# Patient Record
Sex: MALE | Race: WHITE
[De-identification: names, ages, dates, MRNs, and addresses within clinical notes are randomized per-mention and may not be internally consistent; named-entity substitution may affect disease eponyms.]

---

## 2017-01-02 ENCOUNTER — HOSPITAL ENCOUNTER (OUTPATIENT)
Dept: HOSPITAL 83 - PHLEB | Age: 67
Discharge: HOME | End: 2017-01-02
Attending: FAMILY MEDICINE
Payer: MEDICARE

## 2017-01-02 DIAGNOSIS — R73.09: Primary | ICD-10-CM

## 2019-06-04 ENCOUNTER — HOSPITAL ENCOUNTER (OUTPATIENT)
Dept: HOSPITAL 83 - LAB | Age: 69
Discharge: HOME | End: 2019-06-04
Attending: PODIATRIST
Payer: MEDICARE

## 2019-06-04 DIAGNOSIS — M10.9: Primary | ICD-10-CM

## 2019-06-04 DIAGNOSIS — E78.00: ICD-10-CM

## 2019-06-04 DIAGNOSIS — L03.90: ICD-10-CM

## 2019-06-04 LAB
ALBUMIN SERPL-MCNC: 3.3 GM/DL (ref 3.1–4.5)
ALP SERPL-CCNC: 92 U/L (ref 45–117)
ALT SERPL W P-5'-P-CCNC: 30 U/L (ref 12–78)
AST SERPL-CCNC: 21 IU/L (ref 3–35)
BASOPHILS # BLD AUTO: 0 10*3/UL (ref 0–0.1)
BASOPHILS NFR BLD AUTO: 0.3 % (ref 0–1)
BUN SERPL-MCNC: 6 MG/DL (ref 7–24)
CHLORIDE SERPL-SCNC: 100 MMOL/L (ref 98–107)
CREAT SERPL-MCNC: 0.95 MG/DL (ref 0.7–1.3)
EOSINOPHIL # BLD AUTO: 0.1 10*3/UL (ref 0–0.4)
EOSINOPHIL # BLD AUTO: 0.7 % (ref 1–4)
ERYTHROCYTE [DISTWIDTH] IN BLOOD BY AUTOMATED COUNT: 12.2 % (ref 0–14.5)
HCT VFR BLD AUTO: 54.6 % (ref 42–52)
HGB BLD-MCNC: 18.8 G/DL (ref 14–18)
LYMPHOCYTES # BLD AUTO: 1.5 10*3/UL (ref 1.3–4.4)
LYMPHOCYTES NFR BLD AUTO: 22.3 % (ref 27–41)
MCH RBC QN AUTO: 33.8 PG (ref 27–31)
MCHC RBC AUTO-ENTMCNC: 34.4 G/DL (ref 33–37)
MCV RBC AUTO: 98 FL (ref 80–94)
MONOCYTES # BLD AUTO: 0.9 10*3/UL (ref 0.1–1)
MONOCYTES NFR BLD MANUAL: 12.8 % (ref 3–9)
NEUT #: 4.3 10*3/UL (ref 2.3–7.9)
NEUT %: 63.6 % (ref 47–73)
NRBC BLD QL AUTO: 0 10*3/UL (ref 0–0)
PLATELET # BLD AUTO: 225 10*3/UL (ref 130–400)
PMV BLD AUTO: 9.2 FL (ref 9.6–12.3)
POTASSIUM SERPL-SCNC: 4.3 MMOL/L (ref 3.5–5.1)
PROT SERPL-MCNC: 7.7 GM/DL (ref 6.4–8.2)
RBC # BLD AUTO: 5.57 10*6/UL (ref 4.5–5.9)
SODIUM SERPL-SCNC: 138 MMOL/L (ref 136–145)
URATE SERPL-MCNC: 5.3 MG/DL (ref 3.5–7.2)
WBC NRBC COR # BLD AUTO: 6.7 10*3/UL (ref 4.8–10.8)

## 2019-06-05 LAB
ACTIN IGG SERPL-ACNC: 8 UNITS (ref 0–19)
RHEUMATOID FACT SERPL-ACNC: 15.2 IU/ML (ref 0–13.9)
SPECKLED PATTERN: (no result)

## 2019-07-15 ENCOUNTER — HOSPITAL ENCOUNTER (OUTPATIENT)
Dept: HOSPITAL 83 - LAB | Age: 69
Discharge: HOME | End: 2019-07-15
Attending: FAMILY MEDICINE
Payer: MEDICARE

## 2019-07-15 DIAGNOSIS — Z85.46: ICD-10-CM

## 2019-07-15 DIAGNOSIS — E11.9: Primary | ICD-10-CM

## 2019-07-15 DIAGNOSIS — R97.20: ICD-10-CM

## 2019-07-15 LAB
T4 SERPL-MCNC: 8.7 UG/DL (ref 4.5–12.1)
TSH SERPL DL<=0.005 MIU/L-ACNC: 1.53 UIU/ML (ref 0.36–4.75)

## 2020-02-27 ENCOUNTER — HOSPITAL ENCOUNTER (EMERGENCY)
Dept: HOSPITAL 83 - ED | Age: 70
Discharge: ANOTHER HOSPITAL | End: 2020-02-27
Payer: MEDICARE

## 2020-02-27 ENCOUNTER — HOSPITAL ENCOUNTER (INPATIENT)
Age: 70
LOS: 2 days | Discharge: HOME | DRG: 176 | End: 2020-02-29
Attending: HOSPITALIST | Admitting: FAMILY MEDICINE
Payer: MEDICARE

## 2020-02-27 VITALS — DIASTOLIC BLOOD PRESSURE: 81 MMHG

## 2020-02-27 VITALS — DIASTOLIC BLOOD PRESSURE: 93 MMHG | SYSTOLIC BLOOD PRESSURE: 119 MMHG

## 2020-02-27 VITALS — WEIGHT: 215 LBS | HEIGHT: 73.98 IN | BODY MASS INDEX: 27.59 KG/M2

## 2020-02-27 VITALS — SYSTOLIC BLOOD PRESSURE: 124 MMHG | DIASTOLIC BLOOD PRESSURE: 98 MMHG

## 2020-02-27 VITALS — DIASTOLIC BLOOD PRESSURE: 98 MMHG

## 2020-02-27 VITALS — DIASTOLIC BLOOD PRESSURE: 90 MMHG | SYSTOLIC BLOOD PRESSURE: 130 MMHG

## 2020-02-27 VITALS — SYSTOLIC BLOOD PRESSURE: 136 MMHG | DIASTOLIC BLOOD PRESSURE: 89 MMHG

## 2020-02-27 VITALS — DIASTOLIC BLOOD PRESSURE: 93 MMHG

## 2020-02-27 VITALS — DIASTOLIC BLOOD PRESSURE: 79 MMHG | SYSTOLIC BLOOD PRESSURE: 138 MMHG

## 2020-02-27 VITALS — DIASTOLIC BLOOD PRESSURE: 94 MMHG

## 2020-02-27 VITALS — DIASTOLIC BLOOD PRESSURE: 77 MMHG

## 2020-02-27 DIAGNOSIS — I82.402: Primary | ICD-10-CM

## 2020-02-27 DIAGNOSIS — E11.9: ICD-10-CM

## 2020-02-27 DIAGNOSIS — I26.99: ICD-10-CM

## 2020-02-27 DIAGNOSIS — E78.00: ICD-10-CM

## 2020-02-27 DIAGNOSIS — Z79.899: ICD-10-CM

## 2020-02-27 DIAGNOSIS — R79.89: ICD-10-CM

## 2020-02-27 LAB
ALBUMIN SERPL-MCNC: 3.3 GM/DL (ref 3.1–4.5)
ALP SERPL-CCNC: 90 U/L (ref 45–117)
ALT SERPL W P-5'-P-CCNC: 27 U/L (ref 12–78)
APTT PPP: 26.2 SECONDS (ref 20–32.1)
AST SERPL-CCNC: 22 IU/L (ref 3–35)
BASOPHILS # BLD AUTO: 0 10*3/UL (ref 0–0.1)
BASOPHILS NFR BLD AUTO: 0.2 % (ref 0–1)
BUN SERPL-MCNC: 8 MG/DL (ref 7–24)
CHLORIDE SERPL-SCNC: 104 MMOL/L (ref 98–107)
CREAT SERPL-MCNC: 1.05 MG/DL (ref 0.7–1.3)
EOSINOPHIL # BLD AUTO: 0.2 10*3/UL (ref 0–0.4)
EOSINOPHIL # BLD AUTO: 1.5 % (ref 1–4)
ERYTHROCYTE [DISTWIDTH] IN BLOOD BY AUTOMATED COUNT: 12.3 % (ref 0–14.5)
HCT VFR BLD AUTO: 58.4 % (ref 42–52)
HGB BLD-MCNC: 20 G/DL (ref 14–18)
INR BLD: 1 (ref 2–3.5)
LYMPHOCYTES # BLD AUTO: 3.1 10*3/UL (ref 1.3–4.4)
LYMPHOCYTES NFR BLD AUTO: 29.3 % (ref 27–41)
MCH RBC QN AUTO: 33.1 PG (ref 27–31)
MCHC RBC AUTO-ENTMCNC: 34.2 G/DL (ref 33–37)
MCV RBC AUTO: 96.7 FL (ref 80–94)
MONOCYTES # BLD AUTO: 1.1 10*3/UL (ref 0.1–1)
MONOCYTES NFR BLD MANUAL: 10.2 % (ref 3–9)
NEUT #: 6.1 10*3/UL (ref 2.3–7.9)
NEUT %: 58.6 % (ref 47–73)
NRBC BLD QL AUTO: 0 10*3/UL (ref 0–0)
PLATELET # BLD AUTO: 120 10*3/UL (ref 130–400)
PMV BLD AUTO: 9.2 FL (ref 9.6–12.3)
POTASSIUM SERPL-SCNC: 3.9 MMOL/L (ref 3.5–5.1)
PROT SERPL-MCNC: 7.3 GM/DL (ref 6.4–8.2)
RBC # BLD AUTO: 6.04 10*6/UL (ref 4.5–5.9)
SODIUM SERPL-SCNC: 139 MMOL/L (ref 136–145)
TROPONIN I SERPL-MCNC: 0.97 NG/ML (ref ?–0.04)
WBC NRBC COR # BLD AUTO: 10.5 10*3/UL (ref 4.8–10.8)

## 2020-02-27 PROCEDURE — 2140000000 HC CCU INTERMEDIATE R&B

## 2020-02-27 ASSESSMENT — PAIN SCALES - GENERAL: PAINLEVEL_OUTOF10: 0

## 2020-02-28 ENCOUNTER — APPOINTMENT (OUTPATIENT)
Dept: CT IMAGING | Age: 70
DRG: 176 | End: 2020-02-28
Attending: HOSPITALIST
Payer: MEDICARE

## 2020-02-28 PROBLEM — I49.9 IRREGULAR HEART BEATS: Status: ACTIVE | Noted: 2020-02-28

## 2020-02-28 PROBLEM — E11.65 TYPE 2 DIABETES MELLITUS WITH HYPERGLYCEMIA (HCC): Status: ACTIVE | Noted: 2020-02-28

## 2020-02-28 PROBLEM — I82.4Z2 DVT, LOWER EXTREMITY, DISTAL, ACUTE, LEFT (HCC): Status: ACTIVE | Noted: 2020-02-28

## 2020-02-28 PROBLEM — R77.8 ELEVATED TROPONIN: Status: ACTIVE | Noted: 2020-02-28

## 2020-02-28 PROBLEM — F10.20 UNCOMPLICATED ALCOHOL DEPENDENCE (HCC): Status: ACTIVE | Noted: 2020-02-28

## 2020-02-28 PROBLEM — R55 SYNCOPE AND COLLAPSE: Status: ACTIVE | Noted: 2020-02-28

## 2020-02-28 PROBLEM — Z85.46 HISTORY OF PROSTATE CANCER: Status: ACTIVE | Noted: 2020-02-28

## 2020-02-28 PROBLEM — D75.1 POLYCYTHEMIA: Status: ACTIVE | Noted: 2020-02-28

## 2020-02-28 PROBLEM — G62.9 PERIPHERAL NEUROPATHY: Status: ACTIVE | Noted: 2020-02-28

## 2020-02-28 PROBLEM — R65.10 SIRS (SYSTEMIC INFLAMMATORY RESPONSE SYNDROME) (HCC): Status: ACTIVE | Noted: 2020-02-28

## 2020-02-28 LAB
ALBUMIN SERPL-MCNC: 3.6 G/DL (ref 3.5–5.2)
ALP BLD-CCNC: 81 U/L (ref 40–129)
ALT SERPL-CCNC: 16 U/L (ref 0–40)
ANION GAP SERPL CALCULATED.3IONS-SCNC: 15 MMOL/L (ref 7–16)
APTT: 39.7 SEC (ref 24.5–35.1)
APTT: 82.2 SEC (ref 24.5–35.1)
APTT: 82.4 SEC (ref 24.5–35.1)
AST SERPL-CCNC: 26 U/L (ref 0–39)
BASOPHILS ABSOLUTE: 0.02 E9/L (ref 0–0.2)
BASOPHILS ABSOLUTE: 0.02 E9/L (ref 0–0.2)
BASOPHILS RELATIVE PERCENT: 0.2 % (ref 0–2)
BASOPHILS RELATIVE PERCENT: 0.2 % (ref 0–2)
BILIRUB SERPL-MCNC: 0.7 MG/DL (ref 0–1.2)
BUN BLDV-MCNC: 9 MG/DL (ref 8–23)
CALCIUM SERPL-MCNC: 9.6 MG/DL (ref 8.6–10.2)
CHLORIDE BLD-SCNC: 99 MMOL/L (ref 98–107)
CK MB: 5.6 NG/ML (ref 0–7.7)
CO2: 24 MMOL/L (ref 22–29)
CREAT SERPL-MCNC: 0.7 MG/DL (ref 0.7–1.2)
EKG ATRIAL RATE: 90 BPM
EKG P AXIS: 49 DEGREES
EKG P-R INTERVAL: 210 MS
EKG Q-T INTERVAL: 386 MS
EKG QRS DURATION: 98 MS
EKG QTC CALCULATION (BAZETT): 472 MS
EKG R AXIS: -23 DEGREES
EKG T AXIS: 50 DEGREES
EKG VENTRICULAR RATE: 90 BPM
EOSINOPHILS ABSOLUTE: 0.1 E9/L (ref 0.05–0.5)
EOSINOPHILS ABSOLUTE: 0.14 E9/L (ref 0.05–0.5)
EOSINOPHILS RELATIVE PERCENT: 1.1 % (ref 0–6)
EOSINOPHILS RELATIVE PERCENT: 1.6 % (ref 0–6)
FOLATE: >20 NG/ML (ref 4.8–24.2)
GFR AFRICAN AMERICAN: >60
GFR NON-AFRICAN AMERICAN: >60 ML/MIN/1.73
GLUCOSE BLD-MCNC: 205 MG/DL (ref 74–99)
HBA1C MFR BLD: 8.1 % (ref 4–5.6)
HCT VFR BLD CALC: 56.3 % (ref 37–54)
HCT VFR BLD CALC: 60.7 % (ref 37–54)
HEMOGLOBIN: 18.8 G/DL (ref 12.5–16.5)
HEMOGLOBIN: 20.2 G/DL (ref 12.5–16.5)
HOMOCYSTEINE: 10.2 UMOL/L (ref 0–15)
IMMATURE GRANULOCYTES #: 0.02 E9/L
IMMATURE GRANULOCYTES #: 0.04 E9/L
IMMATURE GRANULOCYTES %: 0.2 % (ref 0–5)
IMMATURE GRANULOCYTES %: 0.5 % (ref 0–5)
INR BLD: 1
LV EF: 55 %
LVEF MODALITY: NORMAL
LYMPHOCYTES ABSOLUTE: 2.21 E9/L (ref 1.5–4)
LYMPHOCYTES ABSOLUTE: 2.38 E9/L (ref 1.5–4)
LYMPHOCYTES RELATIVE PERCENT: 25.1 % (ref 20–42)
LYMPHOCYTES RELATIVE PERCENT: 26.6 % (ref 20–42)
MCH RBC QN AUTO: 32 PG (ref 26–35)
MCH RBC QN AUTO: 32.3 PG (ref 26–35)
MCHC RBC AUTO-ENTMCNC: 33.3 % (ref 32–34.5)
MCHC RBC AUTO-ENTMCNC: 33.4 % (ref 32–34.5)
MCV RBC AUTO: 95.7 FL (ref 80–99.9)
MCV RBC AUTO: 97 FL (ref 80–99.9)
METER GLUCOSE: 163 MG/DL (ref 74–99)
METER GLUCOSE: 177 MG/DL (ref 74–99)
METER GLUCOSE: 180 MG/DL (ref 74–99)
METER GLUCOSE: 266 MG/DL (ref 74–99)
MONOCYTES ABSOLUTE: 0.94 E9/L (ref 0.1–0.95)
MONOCYTES ABSOLUTE: 1.14 E9/L (ref 0.1–0.95)
MONOCYTES RELATIVE PERCENT: 10.7 % (ref 2–12)
MONOCYTES RELATIVE PERCENT: 12.7 % (ref 2–12)
NEUTROPHILS ABSOLUTE: 5.3 E9/L (ref 1.8–7.3)
NEUTROPHILS ABSOLUTE: 5.47 E9/L (ref 1.8–7.3)
NEUTROPHILS RELATIVE PERCENT: 59.2 % (ref 43–80)
NEUTROPHILS RELATIVE PERCENT: 61.9 % (ref 43–80)
PATHOLOGIST REVIEW: NORMAL
PDW BLD-RTO: 12.2 FL (ref 11.5–15)
PDW BLD-RTO: 12.2 FL (ref 11.5–15)
PLATELET # BLD: 115 E9/L (ref 130–450)
PLATELET # BLD: 119 E9/L (ref 130–450)
PMV BLD AUTO: 9.2 FL (ref 7–12)
PMV BLD AUTO: 9.5 FL (ref 7–12)
POTASSIUM SERPL-SCNC: 4 MMOL/L (ref 3.5–5)
PROSTATE SPECIFIC ANTIGEN: 0.01 NG/ML (ref 0–4)
PROTHROMBIN TIME: 11.1 SEC (ref 9.3–12.4)
RBC # BLD: 5.88 E12/L (ref 3.8–5.8)
RBC # BLD: 6.26 E12/L (ref 3.8–5.8)
REASON FOR REJECTION: NORMAL
REJECTED TEST: NORMAL
SODIUM BLD-SCNC: 138 MMOL/L (ref 132–146)
TOTAL CK: 152 U/L (ref 20–200)
TOTAL PROTEIN: 6.9 G/DL (ref 6.4–8.3)
TROPONIN: 0.04 NG/ML (ref 0–0.03)
TROPONIN: 0.06 NG/ML (ref 0–0.03)
TSH SERPL DL<=0.05 MIU/L-ACNC: 3.97 UIU/ML (ref 0.27–4.2)
VITAMIN B-12: 804 PG/ML (ref 211–946)
WBC # BLD: 8.8 E9/L (ref 4.5–11.5)
WBC # BLD: 9 E9/L (ref 4.5–11.5)

## 2020-02-28 PROCEDURE — 85025 COMPLETE CBC W/AUTO DIFF WBC: CPT

## 2020-02-28 PROCEDURE — 93010 ELECTROCARDIOGRAM REPORT: CPT | Performed by: INTERNAL MEDICINE

## 2020-02-28 PROCEDURE — 82607 VITAMIN B-12: CPT

## 2020-02-28 PROCEDURE — 6370000000 HC RX 637 (ALT 250 FOR IP): Performed by: HOSPITALIST

## 2020-02-28 PROCEDURE — 83090 ASSAY OF HOMOCYSTEINE: CPT

## 2020-02-28 PROCEDURE — 82553 CREATINE MB FRACTION: CPT

## 2020-02-28 PROCEDURE — 81241 F5 GENE: CPT

## 2020-02-28 PROCEDURE — 86146 BETA-2 GLYCOPROTEIN ANTIBODY: CPT

## 2020-02-28 PROCEDURE — 81240 F2 GENE: CPT

## 2020-02-28 PROCEDURE — 82668 ASSAY OF ERYTHROPOIETIN: CPT

## 2020-02-28 PROCEDURE — 83036 HEMOGLOBIN GLYCOSYLATED A1C: CPT

## 2020-02-28 PROCEDURE — 85730 THROMBOPLASTIN TIME PARTIAL: CPT

## 2020-02-28 PROCEDURE — 2500000003 HC RX 250 WO HCPCS: Performed by: FAMILY MEDICINE

## 2020-02-28 PROCEDURE — 2580000003 HC RX 258: Performed by: FAMILY MEDICINE

## 2020-02-28 PROCEDURE — 82962 GLUCOSE BLOOD TEST: CPT

## 2020-02-28 PROCEDURE — 84153 ASSAY OF PSA TOTAL: CPT

## 2020-02-28 PROCEDURE — 6370000000 HC RX 637 (ALT 250 FOR IP): Performed by: FAMILY MEDICINE

## 2020-02-28 PROCEDURE — 6360000004 HC RX CONTRAST MEDICATION: Performed by: FAMILY MEDICINE

## 2020-02-28 PROCEDURE — 81400 MOPATH PROCEDURE LEVEL 1: CPT

## 2020-02-28 PROCEDURE — 82550 ASSAY OF CK (CPK): CPT

## 2020-02-28 PROCEDURE — 2580000003 HC RX 258: Performed by: RADIOLOGY

## 2020-02-28 PROCEDURE — 6360000004 HC RX CONTRAST MEDICATION: Performed by: RADIOLOGY

## 2020-02-28 PROCEDURE — 93005 ELECTROCARDIOGRAM TRACING: CPT | Performed by: FAMILY MEDICINE

## 2020-02-28 PROCEDURE — 86147 CARDIOLIPIN ANTIBODY EA IG: CPT

## 2020-02-28 PROCEDURE — 6370000000 HC RX 637 (ALT 250 FOR IP): Performed by: INTERNAL MEDICINE

## 2020-02-28 PROCEDURE — 6360000002 HC RX W HCPCS: Performed by: FAMILY MEDICINE

## 2020-02-28 PROCEDURE — 93306 TTE W/DOPPLER COMPLETE: CPT

## 2020-02-28 PROCEDURE — 85610 PROTHROMBIN TIME: CPT

## 2020-02-28 PROCEDURE — 81291 MTHFR GENE: CPT

## 2020-02-28 PROCEDURE — 82746 ASSAY OF FOLIC ACID SERUM: CPT

## 2020-02-28 PROCEDURE — 84443 ASSAY THYROID STIM HORMONE: CPT

## 2020-02-28 PROCEDURE — 74177 CT ABD & PELVIS W/CONTRAST: CPT

## 2020-02-28 PROCEDURE — 80053 COMPREHEN METABOLIC PANEL: CPT

## 2020-02-28 PROCEDURE — 2700000000 HC OXYGEN THERAPY PER DAY

## 2020-02-28 PROCEDURE — 84484 ASSAY OF TROPONIN QUANT: CPT

## 2020-02-28 PROCEDURE — 99223 1ST HOSP IP/OBS HIGH 75: CPT | Performed by: SURGERY

## 2020-02-28 PROCEDURE — 2140000000 HC CCU INTERMEDIATE R&B

## 2020-02-28 PROCEDURE — 36415 COLL VENOUS BLD VENIPUNCTURE: CPT

## 2020-02-28 RX ORDER — LANOLIN ALCOHOL/MO/W.PET/CERES
3 CREAM (GRAM) TOPICAL NIGHTLY PRN
Status: DISCONTINUED | OUTPATIENT
Start: 2020-02-28 | End: 2020-02-29 | Stop reason: HOSPADM

## 2020-02-28 RX ORDER — DEXTROSE MONOHYDRATE 50 MG/ML
100 INJECTION, SOLUTION INTRAVENOUS PRN
Status: DISCONTINUED | OUTPATIENT
Start: 2020-02-28 | End: 2020-02-29 | Stop reason: HOSPADM

## 2020-02-28 RX ORDER — ONDANSETRON 2 MG/ML
4 INJECTION INTRAMUSCULAR; INTRAVENOUS EVERY 6 HOURS PRN
Status: DISCONTINUED | OUTPATIENT
Start: 2020-02-28 | End: 2020-02-29 | Stop reason: HOSPADM

## 2020-02-28 RX ORDER — NICOTINE POLACRILEX 4 MG
15 LOZENGE BUCCAL PRN
Status: DISCONTINUED | OUTPATIENT
Start: 2020-02-28 | End: 2020-02-29 | Stop reason: HOSPADM

## 2020-02-28 RX ORDER — THIAMINE MONONITRATE (VIT B1) 100 MG
100 TABLET ORAL DAILY
Status: DISCONTINUED | OUTPATIENT
Start: 2020-02-29 | End: 2020-02-29 | Stop reason: HOSPADM

## 2020-02-28 RX ORDER — HEPARIN SODIUM 1000 [USP'U]/ML
40 INJECTION, SOLUTION INTRAVENOUS; SUBCUTANEOUS PRN
Status: DISCONTINUED | OUTPATIENT
Start: 2020-02-28 | End: 2020-02-29 | Stop reason: HOSPADM

## 2020-02-28 RX ORDER — SODIUM CHLORIDE 0.9 % (FLUSH) 0.9 %
10 SYRINGE (ML) INJECTION EVERY 12 HOURS SCHEDULED
Status: DISCONTINUED | OUTPATIENT
Start: 2020-02-28 | End: 2020-02-29 | Stop reason: HOSPADM

## 2020-02-28 RX ORDER — SODIUM CHLORIDE 0.9 % (FLUSH) 0.9 %
10 SYRINGE (ML) INJECTION PRN
Status: DISCONTINUED | OUTPATIENT
Start: 2020-02-28 | End: 2020-02-29 | Stop reason: HOSPADM

## 2020-02-28 RX ORDER — HEPARIN SODIUM 1000 [USP'U]/ML
80 INJECTION, SOLUTION INTRAVENOUS; SUBCUTANEOUS PRN
Status: DISCONTINUED | OUTPATIENT
Start: 2020-02-28 | End: 2020-02-29 | Stop reason: HOSPADM

## 2020-02-28 RX ORDER — MULTIVITAMIN WITH FOLIC ACID 400 MCG
1 TABLET ORAL DAILY
Status: DISCONTINUED | OUTPATIENT
Start: 2020-02-28 | End: 2020-02-29 | Stop reason: HOSPADM

## 2020-02-28 RX ORDER — ACETAMINOPHEN 325 MG/1
650 TABLET ORAL EVERY 6 HOURS PRN
Status: DISCONTINUED | OUTPATIENT
Start: 2020-02-28 | End: 2020-02-29 | Stop reason: HOSPADM

## 2020-02-28 RX ORDER — POLYETHYLENE GLYCOL 3350 17 G/17G
17 POWDER, FOR SOLUTION ORAL DAILY PRN
Status: DISCONTINUED | OUTPATIENT
Start: 2020-02-28 | End: 2020-02-29 | Stop reason: HOSPADM

## 2020-02-28 RX ORDER — HEPARIN SODIUM 10000 [USP'U]/100ML
18 INJECTION, SOLUTION INTRAVENOUS CONTINUOUS
Status: DISCONTINUED | OUTPATIENT
Start: 2020-02-28 | End: 2020-02-28 | Stop reason: CLARIF

## 2020-02-28 RX ORDER — DEXTROSE MONOHYDRATE 25 G/50ML
12.5 INJECTION, SOLUTION INTRAVENOUS PRN
Status: DISCONTINUED | OUTPATIENT
Start: 2020-02-28 | End: 2020-02-29 | Stop reason: HOSPADM

## 2020-02-28 RX ADMIN — PERFLUTREN 1.65 MG: 6.52 INJECTION, SUSPENSION INTRAVENOUS at 09:15

## 2020-02-28 RX ADMIN — INSULIN LISPRO 2 UNITS: 100 INJECTION, SOLUTION INTRAVENOUS; SUBCUTANEOUS at 07:38

## 2020-02-28 RX ADMIN — SODIUM CHLORIDE, PRESERVATIVE FREE 10 ML: 5 INJECTION INTRAVENOUS at 20:49

## 2020-02-28 RX ADMIN — MELATONIN 3 MG ORAL TABLET 3 MG: 3 TABLET ORAL at 22:47

## 2020-02-28 RX ADMIN — INSULIN LISPRO 2 UNITS: 100 INJECTION, SOLUTION INTRAVENOUS; SUBCUTANEOUS at 20:43

## 2020-02-28 RX ADMIN — INSULIN LISPRO 6 UNITS: 100 INJECTION, SOLUTION INTRAVENOUS; SUBCUTANEOUS at 17:32

## 2020-02-28 RX ADMIN — IOHEXOL 50 ML: 240 INJECTION, SOLUTION INTRATHECAL; INTRAVASCULAR; INTRAVENOUS; ORAL at 11:26

## 2020-02-28 RX ADMIN — FOLIC ACID: 5 INJECTION, SOLUTION INTRAMUSCULAR; INTRAVENOUS; SUBCUTANEOUS at 05:07

## 2020-02-28 RX ADMIN — HEPARIN SODIUM 18 UNITS/KG/HR: 10000 INJECTION, SOLUTION INTRAVENOUS at 03:10

## 2020-02-28 RX ADMIN — APIXABAN 10 MG: 5 TABLET, FILM COATED ORAL at 20:49

## 2020-02-28 RX ADMIN — MAGNESIUM GLUCONATE 500 MG ORAL TABLET 400 MG: 500 TABLET ORAL at 20:43

## 2020-02-28 RX ADMIN — MULTIVITAMIN TABLET 1 TABLET: TABLET at 09:43

## 2020-02-28 RX ADMIN — MAGNESIUM GLUCONATE 500 MG ORAL TABLET 400 MG: 500 TABLET ORAL at 09:43

## 2020-02-28 RX ADMIN — IOPAMIDOL 110 ML: 755 INJECTION, SOLUTION INTRAVENOUS at 12:39

## 2020-02-28 RX ADMIN — Medication 10 ML: at 12:40

## 2020-02-28 ASSESSMENT — PAIN SCALES - GENERAL
PAINLEVEL_OUTOF10: 0

## 2020-02-28 NOTE — H&P
Hospital Medicine History & Physical      PCP: Aniceto Harris DO    Date of Admission: 2/27/2020    Date of Service: Pt seen/examined on 2/28/2020 and Admitted to Inpatient with expected LOS greater than two midnights due to medical therapy. Chief Complaint: Syncope and collapse      History Of Present Illness:      77 y.o. male who presented to [facility] from [place] with past medical history of gout, prostate cancer status post prostatectomy and radiation treatment in 2006, diabetes, hyperlipidemia, former tobacco dependence, alcohol dependence and diabetic neuropathy. Patient apparently has had elevated hemoglobin levels since 2016 which he is not aware of. He does not have any formal diagnosis in this regard. He was in his usual state of health until 2 days ago when he had syncopal episode and collapsed at home. Patient was going to stand up from a sitting position to answer a phone call when he turned around and passed out. Episode was brief, associated with dizziness. Patient woke up on the floor, he denies any diaphoresis. He has had no chest pain, shortness of breath or dyspnea on exertion. He did not go to the hospital until the day after. He has been having leg swelling for 3 days. This has been constant, mild to moderate in intensity and associated with minimal pain in the groin area. No redness. He denies any prior injury. No urinary complaints. Bowel movements can be slow at times. Medical records have been reviewed and summarized. Vital signs notable for heart rate of 113, respiratory 22 and blood pressure 136/105. Labs from outside facility showed hemoglobin of 20, platelet count 339 and white count of 10.5. Creatinine 1.1, glucose 227, INR 1, rest of chemistry were normal.  Magnesium 1.8, total bilirubin 1.3 otherwise normal liver enzymes. Initial troponin was 0.97-> 1.46-> 1.2. Chest x-ray is negative.   CTA of the lung shows extensive bilateral PEs without evidence

## 2020-02-28 NOTE — CONSULTS
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 D/c heparin gtt  No need of EKOS cath    Polycythemia  Work up per Hem-Onc  PSG as outpatient.

## 2020-02-28 NOTE — CONSULTS
[address]                  [address] [place],  [facility]                                  CONSULTATION    PATIENT NAME: Reyes Ross                     :        1950  MED REC NO:   [id number]                            ROOM:       8095  ACCOUNT NO:   [de-identified]                           ADMIT DATE: 2020  PROVIDER:     Davie Joel MD    CONSULT DATE:  2020    HISTORY OF PRESENT ILLNESS:  This 67-year-old white male, patient of Dr. Ting Hester and Dr. Jaquelin Fall, is referred with finding of bilateral  pulmonary emboli and DVT in the left lower extremity. He is admitted  after an episode of syncope. He was feeling well until about 2 days ago  when this episode happened, and he collapsed at home. Prior to that,  recently he denies any complaints related to any system whatsoever. No  fever. No weight loss. No history of any recent bed confinement or any  long car travel or airplane travel or any hormone medications or any  other recent complaints. PAST MEDICAL HISTORY:  Prostate cancer more than 10 years ago, and he  had radiation treatments, but he states he did not require any other  treatments. No history of any other cancer. No history of any  thrombosis. He had history of tonsillectomy and prostatectomy in ,  diabetes mellitus, diabetic neuropathy. ALLERGIES:  He is not allergic to any medicines. SOCIAL HISTORY:  He quit smoking cigarettes about 14 years ago. Drinks  alcohol six to eight beers a day. PHYSICAL EXAMINATION:  GENERAL:  He is alert and appropriate. HEENT:  No icterus. NECK:  No neck or axillary mass. HEART:  Regular. LUNGS:  Clear. ABDOMEN:  No palpable mass or ascites. EXTREMITIES:  There is edema of the left leg. NEUROLOGIC:  No focal neurological deficit. LABORATORY DATA:  The serum PSA is 0.01.   The CBC shows hemoglobin 18.8  gm; normal white blood cell count; platelets 115,000/mm3. The CMP is  unremarkable. ASSESSMENT AND PLAN:  1. Bilateral pulmonary emboli by the CTA of the chest at [facility], and the ultrasound examination showing left lower extremity  DVT. The episode appears to be unprovoked. He is on heparin infusion  at this time. 2.  The CTA of the chest at [facility] did not report any signs of  neoplasm. I will plan to obtain a CT scan of the abdomen and pelvis  also. I have discussed this with the patient. 3.  History of prostate cancer for which he had prostatectomy and  radiation treatments, not requiring any other treatments. This was few  years ago, and his serum PSA level is normal.  4.  Polycythemia and mild thrombocytopenia. Apparently, he has had the  high hemoglobin and hematocrit for the last three to four years  according to the notes. 5.  I have ordered the workup for thrombophilia as well as for the  polycythemia. Thank you for letting me participate in his care.         Keeley Sanchez MD    D: 02/28/2020 10:21:45       T: 02/28/2020 10:31:11     [email or web address]  Job#: [id number]     Doc#: [id number]    CC:

## 2020-02-28 NOTE — CARE COORDINATION
SOCIAL WORK/CASEMANAGEMENT TRANSITION OF CARE PLANNING: met with pt in the room this a.m. he lives with wife in a trailer with 1 step to enter. Pt and wife are both retired and independent with no dme or hhc . The plan is home with no needs. Will follow for any anticoagulation needs. [facility]  Jannie Bradshaw  2/28/2020

## 2020-02-28 NOTE — CONSULTS
(porcine) injection 7,340 Units, 80 Units/kg, Intravenous, PRN, Alisia Forrest MD    heparin (porcine) injection 3,670 Units, 40 Units/kg, Intravenous, PRN, Alisia Forrest MD    heparin 25,000 units in dextrose 5% 250 mL infusion, 18 Units/kg/hr, Intravenous, Continuous, Karlos Hernandez MD, Last Rate: 14.7 mL/hr at 20 1039, 16 Units/kg/hr at 20 1039    [START ON 2020] vitamin B-1 (THIAMINE) tablet 100 mg, 100 mg, Oral, Daily, Alisia Forrest MD    multivitamin 1 tablet, 1 tablet, Oral, Daily, Alisia Forrest MD, 1 tablet at 20 0943    magnesium oxide (MAG-OX) tablet 400 mg, 400 mg, Oral, BID, Alisia Forrest MD, 400 mg at 20 0943    insulin lispro (HUMALOG) injection vial 0-10 Units, 0-10 Units, Subcutaneous, 4x Daily AC & HS, Alisia Forrest MD, 2 Units at 20 0738    glucose (GLUTOSE) 40 % oral gel 15 g, 15 g, Oral, PRN, Alisia Forrest MD    dextrose 50 % IV solution, 12.5 g, Intravenous, PRN, Alisia Forrest MD    glucagon (rDNA) injection 1 mg, 1 mg, Intramuscular, PRN, Alisia Forrest MD    dextrose 5 % solution, 100 mL/hr, Intravenous, PRN, Alisia Forrest MD    sodium chloride flush 0.9 % injection 10 mL, 10 mL, Intravenous, PRN, Christelle Helm MD, 10 mL at 20 1240    Allergies:  Patient has no known allergies.     Social History     Socioeconomic History    Marital status:      Spouse name: Not on file    Number of children: Not on file    Years of education: Not on file    Highest education level: Not on file   Occupational History    Not on file   Social Needs    Financial resource strain: Not on file    Food insecurity:     Worry: Not on file     Inability: Not on file    Transportation needs:     Medical: Not on file     Non-medical: Not on file   Tobacco Use    Smoking status: Former Smoker     Types: Cigarettes     Last attempt to quit: 2006     Years since quittin.1   Substance and Sexual Activity    Alcohol use: Not on file    Drug use: Not on file    Sexual activity: Not on file   Lifestyle    Physical activity:     Days per week: Not on file     Minutes per session: Not on file    Stress: Not on file   Relationships    Social connections:     Talks on phone: Not on file     Gets together: Not on file     Attends [employer] service: Not on file     Active member of club or organization: Not on file     Attends meetings of clubs or organizations: Not on file     Relationship status: Not on file    Intimate partner violence:     Fear of current or ex partner: Not on file     Emotionally abused: Not on file     Physically abused: Not on file     Forced sexual activity: Not on file   Other Topics Concern    Not on file   Social History Narrative    Not on file        No family history on file. REVIEW OF SYSTEMS:    Gen: Negative for nausea, vomiting, diarrhea, fever, chills, night sweats, no weight loss or weight gain  HEENT: Negative for double vision, blurred vision, sore throat   Heart: Negative for HTN, palpitations, chest pain, or pain radiating to the arm, jaw or teeth  Lungs: See history of present illness negative for wheezes, shortness of breath while at rest or lying down  GI: Negative for nausea, vomiting, diarrhea, or constipation  : Negative for dysuria, hematuria, increased frequency or urgency  Endo: Negative for polydipsia, polyuria, or heat or cold intolerances. Heme: Negative leg swelling, blood or bleeding disorders  Psych: Negative for Depression or anxiety  Ortho: Negative for pain in the joints, arthritis or gout  Vascular: Negative for claudication, calf pain, ulcerations, or rest pain.  He also denies any nonhealing lower extremity wounds        PHYSICAL EXAM:    Vitals:    02/28/20 0807   BP: (!) 143/97   Pulse: 94   Resp: 18   Temp: 97.5 °F (36.4 °C)   SpO2: 96%     GENERAL APPEARANCE:  Well-developed well-nourished alert and oriented answers questions appropriately the patient does not appear in any acute distress. HEAD: Head is normocephalic atraumatic, with Normal range of motion. EYES: Inspection of the conjunctiva and lids demonstrated no abnormalities, no jaundice, no scleral icterus, PERRL, EOMI, and vision are grossly intact. EARS:  External auditory canals demonstrate no abnormalities. Ears are well set hearing is grossly intact. SKIN: Flaky and swollen compared to contralateral right side. But soft color is symmetrical to the contralateral right side the rest of the exam demonstrates normal in color, texture, and turgor, no visible lesions, no jaundice. NECK: Supple, nontender no lymphadenopathy trachea is midline no jugular venous distention no carotid bruits auscultated. LUNGS:  Clear to auscultation bilaterally no wheezes rales or rhonchi good respiratory changes noted. CARDIOVASCULAR: Currently regular rate and rhythm no murmur rub or gallop that I could appreciate. ABDOMEN: Soft nontender no rebound or guarding, positive bowel sounds no pulsatile abdominal masses. No organosplenomegaly that I can appreciate. EXTREMITIES: Bilateral palpable brachial radial pulses are symmetric. Bilateral palpable femoral pulses. Bilateral palpable dorsalis pedis and posterior tibial pulses. Left leg is swollen compared to contralateral right side. The calf is soft. Motor and sensation are intact. MUSKULOSKELETAL  adequately aligned spine, range of motion appears to be intact with regards to the spine and upper and lower extremities. No joint tenderness or erythema. Normal muscular development. NEURO: Cranial nerves II through XII grossly intact. Strength and sensation are symmetric and intact throughout. Psychiatric: Mental examination revealed the patient was oriented to person, place, and time.  The patient was able to demonstrate adequate judgment and reason, without any hallucinations abnormal affect or abnormal behavior on today's exam.      LABS:    Lab signed by Maria Teresa Salazar MD on 2/28/2020 at 12:45 PM

## 2020-02-29 VITALS
HEIGHT: 74 IN | DIASTOLIC BLOOD PRESSURE: 79 MMHG | HEART RATE: 77 BPM | OXYGEN SATURATION: 93 % | WEIGHT: 202.4 LBS | SYSTOLIC BLOOD PRESSURE: 143 MMHG | TEMPERATURE: 97.5 F | BODY MASS INDEX: 25.98 KG/M2 | RESPIRATION RATE: 16 BRPM

## 2020-02-29 LAB
ALBUMIN SERPL-MCNC: 3.5 G/DL (ref 3.5–5.2)
ALP BLD-CCNC: 67 U/L (ref 40–129)
ALT SERPL-CCNC: 15 U/L (ref 0–40)
ANION GAP SERPL CALCULATED.3IONS-SCNC: 16 MMOL/L (ref 7–16)
AST SERPL-CCNC: 25 U/L (ref 0–39)
BASOPHILS ABSOLUTE: 0.02 E9/L (ref 0–0.2)
BASOPHILS RELATIVE PERCENT: 0.3 % (ref 0–2)
BILIRUB SERPL-MCNC: 0.8 MG/DL (ref 0–1.2)
BUN BLDV-MCNC: 7 MG/DL (ref 8–23)
CALCIUM SERPL-MCNC: 9.3 MG/DL (ref 8.6–10.2)
CHLORIDE BLD-SCNC: 101 MMOL/L (ref 98–107)
CO2: 23 MMOL/L (ref 22–29)
CREAT SERPL-MCNC: 0.8 MG/DL (ref 0.7–1.2)
EOSINOPHILS ABSOLUTE: 0.21 E9/L (ref 0.05–0.5)
EOSINOPHILS RELATIVE PERCENT: 3.3 % (ref 0–6)
GFR AFRICAN AMERICAN: >60
GFR NON-AFRICAN AMERICAN: >60 ML/MIN/1.73
GLUCOSE BLD-MCNC: 158 MG/DL (ref 74–99)
HCT VFR BLD CALC: 53.9 % (ref 37–54)
HEMOGLOBIN: 18.4 G/DL (ref 12.5–16.5)
IMMATURE GRANULOCYTES #: 0.02 E9/L
IMMATURE GRANULOCYTES %: 0.3 % (ref 0–5)
LYMPHOCYTES ABSOLUTE: 1.58 E9/L (ref 1.5–4)
LYMPHOCYTES RELATIVE PERCENT: 24.8 % (ref 20–42)
MCH RBC QN AUTO: 32.6 PG (ref 26–35)
MCHC RBC AUTO-ENTMCNC: 34.1 % (ref 32–34.5)
MCV RBC AUTO: 95.6 FL (ref 80–99.9)
METER GLUCOSE: 144 MG/DL (ref 74–99)
METER GLUCOSE: 347 MG/DL (ref 74–99)
MONOCYTES ABSOLUTE: 0.9 E9/L (ref 0.1–0.95)
MONOCYTES RELATIVE PERCENT: 14.1 % (ref 2–12)
NEUTROPHILS ABSOLUTE: 3.64 E9/L (ref 1.8–7.3)
NEUTROPHILS RELATIVE PERCENT: 57.2 % (ref 43–80)
PDW BLD-RTO: 12.1 FL (ref 11.5–15)
PLATELET # BLD: 128 E9/L (ref 130–450)
PMV BLD AUTO: 9.5 FL (ref 7–12)
POTASSIUM REFLEX MAGNESIUM: 4.2 MMOL/L (ref 3.5–5)
POTASSIUM SERPL-SCNC: 4.2 MMOL/L (ref 3.5–5)
RBC # BLD: 5.64 E12/L (ref 3.8–5.8)
SODIUM BLD-SCNC: 140 MMOL/L (ref 132–146)
TOTAL PROTEIN: 6.4 G/DL (ref 6.4–8.3)
WBC # BLD: 6.4 E9/L (ref 4.5–11.5)

## 2020-02-29 PROCEDURE — 36415 COLL VENOUS BLD VENIPUNCTURE: CPT

## 2020-02-29 PROCEDURE — 6370000000 HC RX 637 (ALT 250 FOR IP): Performed by: INTERNAL MEDICINE

## 2020-02-29 PROCEDURE — 6370000000 HC RX 637 (ALT 250 FOR IP): Performed by: FAMILY MEDICINE

## 2020-02-29 PROCEDURE — 80048 BASIC METABOLIC PNL TOTAL CA: CPT

## 2020-02-29 PROCEDURE — 85025 COMPLETE CBC W/AUTO DIFF WBC: CPT

## 2020-02-29 PROCEDURE — 82962 GLUCOSE BLOOD TEST: CPT

## 2020-02-29 PROCEDURE — 80053 COMPREHEN METABOLIC PANEL: CPT

## 2020-02-29 PROCEDURE — 2580000003 HC RX 258: Performed by: FAMILY MEDICINE

## 2020-02-29 RX ORDER — GLIMEPIRIDE 1 MG/1
1 TABLET ORAL
Qty: 30 TABLET | Refills: 1 | Status: SHIPPED | OUTPATIENT
Start: 2020-02-29

## 2020-02-29 RX ADMIN — INSULIN LISPRO 8 UNITS: 100 INJECTION, SOLUTION INTRAVENOUS; SUBCUTANEOUS at 11:12

## 2020-02-29 RX ADMIN — Medication 100 MG: at 08:33

## 2020-02-29 RX ADMIN — MULTIVITAMIN TABLET 1 TABLET: TABLET at 08:33

## 2020-02-29 RX ADMIN — SODIUM CHLORIDE, PRESERVATIVE FREE 10 ML: 5 INJECTION INTRAVENOUS at 08:33

## 2020-02-29 RX ADMIN — MAGNESIUM GLUCONATE 500 MG ORAL TABLET 400 MG: 500 TABLET ORAL at 08:33

## 2020-02-29 RX ADMIN — APIXABAN 10 MG: 5 TABLET, FILM COATED ORAL at 08:33

## 2020-02-29 ASSESSMENT — PAIN SCALES - GENERAL
PAINLEVEL_OUTOF10: 0

## 2020-02-29 NOTE — PLAN OF CARE
Problem: Breathing Pattern - Ineffective  Goal: Able to breathe comfortably  Description  Able to breathe comfortably     Outcome: Met This Shift     Problem: PAIN  Goal: Pain control  Description  Patient will demonstrate personal actions to control pain.      Outcome: Met This Shift     Problem: Falls - Risk of:  Goal: Will remain free from falls  Description  Will remain free from falls  Outcome: Met This Shift

## 2020-02-29 NOTE — CONSULTS
[address]                  [address] [place],  [facility]                                  CONSULTATION    PATIENT NAME: Tj Cm                     :        1950  MED REC NO:   [id number]                            ROOM:       6634  ACCOUNT NO:   [de-identified]                           ADMIT DATE: 2020  PROVIDER:     Cara Oliva MD    CONSULT DATE:  2020    Pulmonary consult requested for evaluation of pulmonary embolism. HISTORY OF PRESENT ILLNESS:  The patient is a 70-year-old gentleman, who  has been admitted from [facility] to [address] [place] [address] for  further evaluation of pulmonary embolism. The patient referred that he  had a sudden episode of syncope less than a few seconds at home. The  patient denies any prior symptoms of chest pain, palpitations,  diaphoresis, or hemoptysis. He does endorse to have left leg edema two  days prior to this event. He was taken to the [facility]  and in [facility], he was found to have an abnormal CT scan  of the chest, which showed bilateral pulmonary embolism affecting  proximal and distal pulmonary arteries without RV strain. There was no  abnormality of the liver or lung parenchyma at this time, this after  report, film has not been seen by myself. Venous Doppler ultrasound  showed the patient to have left femoral and superficial DVTs. The  patient has been started on heparin IV drip and was transferred at that  time to our institution. The patient is seen by myself at this time and  he has finished his dinner, he feels good. He denies any chest pain. He denies any palpitations. He denies any nausea, vomiting, or  diarrhea. He denies any fevers. He denies any chills. The rest of the  review of systems is negative. ALLERGIES:  He has no other allergies.     MEDICATIONS AT HOME:  The patient apparently was not taking

## 2020-02-29 NOTE — PROGRESS NOTES
Dimitris Mcfarland        SUBJECTIVE:  Admitted with syncope. No complaints today. OBJECTIVE:    BP (!) 143/79   Pulse 77   Temp 97.5 °F (36.4 °C) (Oral)   Resp 16   Ht 6' 2\" (1.88 m)   Wt 202 lb 6.4 oz (91.8 kg)   SpO2 93%   BMI 25.99 kg/m²   PHYSICAL:  GENERAL:  Alert, orient x 3, in no acute distress. HEENT:  No icterus  LYMPH:  No lymphadenopathy. HEART:  Regular rate and rhythm. No murmurs. LUNGS:  Clear to auscultation. ABDOMEN:  Soft. Non-tender. No mass / ascites  EXTREMITIES:  Warm,dry. Lt.leg  edema. NEURO:  No focal deficits.            CBC with Differential:    Lab Results   Component Value Date    WBC 6.4 02/29/2020    RBC 5.64 02/29/2020    HGB 18.4 02/29/2020    HCT 53.9 02/29/2020     02/29/2020    MCV 95.6 02/29/2020    MCH 32.6 02/29/2020    MCHC 34.1 02/29/2020    RDW 12.1 02/29/2020    LYMPHOPCT 24.8 02/29/2020    MONOPCT 14.1 02/29/2020    BASOPCT 0.3 02/29/2020    MONOSABS 0.90 02/29/2020    LYMPHSABS 1.58 02/29/2020    EOSABS 0.21 02/29/2020    BASOSABS 0.02 02/29/2020        CMP:    Lab Results   Component Value Date     02/29/2020    K 4.2 02/29/2020     02/29/2020    CO2 23 02/29/2020    BUN 7 02/29/2020    CREATININE 0.8 02/29/2020    GFRAA >60 02/29/2020    LABGLOM >60 02/29/2020    GLUCOSE 158 02/29/2020    PROT 6.4 02/29/2020    LABALBU 3.5 02/29/2020    CALCIUM 9.3 02/29/2020    BILITOT 0.8 02/29/2020    ALKPHOS 67 02/29/2020    AST 25 02/29/2020    ALT 15 02/29/2020     LDH:  No results found for: LDH  PT/INR:    Lab Results   Component Value Date    PROTIME 11.1 02/28/2020    INR 1.0 02/28/2020     PTT:    Lab Results   Component Value Date    APTT 82.2 02/28/2020   [APTT  VITAMIN B12: No components found for: B12  FOLATE:    Lab Results   Component Value Date    FOLATE >20.0 02/28/2020     IRON:  No results found for: IRON  Iron Saturation:  No components found for: PERCENTFE  TIBC:  No results found for: TIBC  FERRITIN:  No results found for:

## 2020-02-29 NOTE — DISCHARGE SUMMARY
Hospitalist Discharge Summary    Patient ID: Warner Crystal   Patient : 1950  Patient's PCP: Sandip Andujar DO    Admit Date: 2020   Admitting Physician: Alejandro Padilla MD    Discharge Date:  2020  Discharge Physician: Edy Kumar MD   Discharge Condition: Stable  Discharge Disposition: [facility] course in brief:  (Please refer to daily progress notes for a comprehensive review of the hospitalization by requesting medical records)    This is a 79-year-old male with a history of diabetes mellitus, hyperlipidemia, alcohol dependence, diabetic neuropathy  Patient also has a history of prostatectomy and radiation treatments. Presented for syncope and collapse. Patient found to have extensive bilateral pulmonary embolus without evidence of right heart strain and was transferred from [facility] to [facility] for further evaluation. Patient was started on oral anticoagulant was not a candidate for EKOS lysis. Vascular surgery, hematology oncology and pulmonary services are consulted. Patient was deemed to have a provoked VTE and would likely require lifelong anticoagulation. Patient discharged with recommendations to follow-up with pulmonary, hematology oncology services    Consults:   IP CONSULT TO HEM/ONC  IP CONSULT TO VASCULAR SURGERY  IP CONSULT TO PULMONOLOGY    Discharge Diagnoses:    Bilateral pulmonary embolism  Unprovoked VTE of left lower extremity  History of prostatectomy and prostate cancer  Polycythemia  History of alcohol dependence  History of prostate cancer status post prostatectomy.     Discharge Instructions / Follow up:  Needs to follow-up with urology, hematology oncology and pulmonary services  Continued appropriate risk factor modification of blood pressure, diabetes and serum lipids will remain essential to reducing risk of future atherosclerotic development    Activity: activity as tolerated    Significant are identified in the thoracolumbar spine and plaque in the aorta. Pelvis. Bladder is distended. The prostate gland is prominent measuring 4 x 5.4 cm with  mass effect on the bladder. There is constipation with uncomplicated diverticulosis of the descending and sigmoid colon. The appendix is normal.     Bilateral pulmonary embolism. Uncomplicated diverticulosis of colon with constipation. Enlarged heterogeneous prostate gland. No other worrisome findings are noted. The above findings were telephoned to the ordering physician. ALERT:  THIS IS AN ABNORMAL REPORT       Discharge Medications:      Medication List      START taking these medications    apixaban 5 MG Tabs tablet  Commonly known as:  Eliquis DVT/PE Starter Pack  Take 10 mg (2 tablets) orally twice daily for 7 days, then take 5 mg (1 tablet) orally twice daily thereafter. glimepiride 1 MG tablet  Commonly known as:  Amaryl  Take 1 tablet by mouth every morning (before breakfast)     MVI (BARIATRIC ADVANTAGE MULTI-FORMULA) CHEW TAB  Take 1 tablet by mouth daily           Where to Get Your Medications      You can get these medications from any pharmacy    Bring a paper prescription for each of these medications  · apixaban 5 MG Tabs tablet  · glimepiride 1 MG tablet  · MVI (BARIATRIC ADVANTAGE MULTI-FORMULA) CHEW TAB         Time Spent on discharge is more than 35 minutes in the examination, evaluation, counseling and review of medications and discharge plan.    +++++++++++++++++++++++++++++++++++++++++++++++++  [address], [place]  +++++++++++++++++++++++++++++++++++++++++++++++++  NOTE: This report was transcribed using voice recognition software. Every effort was made to ensure accuracy; however, inadvertent computerized transcription errors may be present.

## 2020-02-29 NOTE — PROGRESS NOTES
Neuro: Awake. Follows commands. O x 3, MUÑOZ  Psych:  calm and interactive     I/O: I/O last 3 completed shifts: In: 553.4 [P.O.:440; I.V.:113.4]  Out: 1225 [Urine:1225]  I/O this shift: In: 480 [P.O.:480]  Out: 300 [Urine:300]     Results:  CBC:   Recent Labs     20  1104 20  0551   WBC 9.0 8.8 6.4   HGB 18.8* 20.2* 18.4*   HCT 56.3* 60.7* 53.9   MCV 95.7 97.0 95.6   * 119* 128*     BMP:   Recent Labs     20  0551    140   K 4.0 4.2  4.2   CL 99 101   CO2 24 23   BUN 9 7*   CREATININE 0.7 0.8     LFT:   Recent Labs     20  0551   ALKPHOS 81 67   ALT 16 15   AST 26 25   PROT 6.9 6.4   BILITOT 0.7 0.8   LABALBU 3.6 3.5     PT/INR:   Recent Labs     20   PROTIME 11.1   INR 1.0     Procalcitonin: No results for input(s): PROCAL in the last 72 hours. Cultures:  No results for input(s): CULTRESP in the last 72 hours. ABG:   No results for input(s): PH, PO2, PCO2, HCO3, BE, O2SAT in the last 72 hours. Films:  Ct Abdomen Pelvis W Iv Contrast Additional Contrast? Oral    Result Date: 2020  Patient MRN:  [id number] : 1950 Age: 77 years Gender: Male Order Date:  2020 10:30 AM EXAM: CT ABDOMEN PELVIS W IV CONTRAST number of images 403 Contrast. Isovue-370, 110 mL intravenously. Omnipaque 240, 50 mL oral. Technique: Low-dose CT  acquisition technique included one of following options; 1 . Automated exposure control, 2. Adjustment of MA and or KV according to patient's size or 3. Use of iterative reconstruction. INDICATION:  for neoplasm for neoplasm COMPARISON: None FINDINGS: The lung bases demonstrate filling defects in the right and left lower lobe pulmonary artery consistent with bilateral pulmonary embolism. There is COPD. The liver, gallbladder, spleen, pancreas, the adrenals, and the kidneys are normal. Degenerative changes are identified in the thoracolumbar spine and plaque in the aorta. Pelvis. Bladder is distended. The prostate gland is prominent measuring 4 x 5.4 cm with  mass effect on the bladder. There is constipation with uncomplicated diverticulosis of the descending and sigmoid colon. The appendix is normal.     Bilateral pulmonary embolism. Uncomplicated diverticulosis of colon with constipation. Enlarged heterogeneous prostate gland. No other worrisome findings are noted. The above findings were telephoned to the ordering physician. ALERT:  THIS IS AN ABNORMAL REPORT       Assessment:  · Bilateral Pulmonary embolism  · Left lower extremity DVT      Plan:  · Keep POX 90-95%, on room air which is baseline  · Off heparin Gtt and on Eliquis.  Take 10mg BID x 7 days then 5mg BID thereafter  · With clots felt to be unprovoked patient will need lifelong anticoagulation as long as no contraindications  · Hematology/Oncology following  · H/o polycythemia--will need outpatient PSG to R/O JAQUAN which is a cause of polycythemia   · Will need to fu at [facility] in 2-3 weeks         Electronically signed by MARICEL Mcnally on 2/29/2020 at 11:33 AM

## 2020-03-02 LAB — ERYTHROPOIETIN: 5 MU/ML (ref 4–27)

## 2020-03-03 LAB
ANTICARDIOLIPIN IGA ANTIBODY: 8 APL (ref 0–11)
ANTICARDIOLIPIN IGG ANTIBODY: 11 GPL (ref 0–14)
BETA-2 GLYCOPROTEIN 1 IGG ANTIBODY: 1 SGU (ref 0–20)
BETA-2 GLYCOPROTEIN 1 IGM ANTIBODY: 4 SMU (ref 0–20)
CARDIOLIPIN AB IGM: 7 MPL (ref 0–12)

## 2020-03-05 LAB — THROMBOPHILIA DNA ASSAY: NORMAL

## 2020-03-12 ENCOUNTER — HOSPITAL ENCOUNTER (OUTPATIENT)
Dept: HOSPITAL 83 - RESCLI | Age: 70
Discharge: HOME | End: 2020-03-12
Attending: INTERNAL MEDICINE
Payer: MEDICARE

## 2020-03-12 DIAGNOSIS — Z79.899: ICD-10-CM

## 2020-03-12 DIAGNOSIS — D75.1: ICD-10-CM

## 2020-03-12 DIAGNOSIS — Z79.84: ICD-10-CM

## 2020-03-12 DIAGNOSIS — E11.9: ICD-10-CM

## 2020-03-12 DIAGNOSIS — C61: Primary | ICD-10-CM

## 2020-03-12 DIAGNOSIS — E78.5: ICD-10-CM

## 2020-03-12 DIAGNOSIS — Z90.79: ICD-10-CM

## 2020-03-12 DIAGNOSIS — I26.99: ICD-10-CM

## 2020-03-12 DIAGNOSIS — E55.9: ICD-10-CM

## 2020-03-12 DIAGNOSIS — Z86.718: ICD-10-CM

## 2020-03-13 ENCOUNTER — HOSPITAL ENCOUNTER (OUTPATIENT)
Dept: HOSPITAL 83 - LAB | Age: 70
Discharge: HOME | End: 2020-03-13
Attending: INTERNAL MEDICINE
Payer: MEDICARE

## 2020-03-13 DIAGNOSIS — C61: Primary | ICD-10-CM

## 2020-03-13 DIAGNOSIS — E11.9: ICD-10-CM

## 2020-03-13 DIAGNOSIS — E78.5: ICD-10-CM

## 2020-03-13 DIAGNOSIS — E55.9: ICD-10-CM

## 2020-03-13 LAB
CHOLEST SERPL-MCNC: 237 MG/DL (ref ?–200)
HDLC SERPL-MCNC: 41 MG/DL (ref 40–60)
LDLC SERPL DIRECT ASSAY-MCNC: 170 MG/DL (ref 9–159)
TRIGL SERPL-MCNC: 131 MG/DL (ref ?–150)
VLDLC SERPL CALC-MCNC: 26 MG/DL (ref 6–40)

## 2020-03-29 PROBLEM — R77.8 ELEVATED TROPONIN: Status: RESOLVED | Noted: 2020-02-28 | Resolved: 2020-03-29

## 2020-05-12 ENCOUNTER — HOSPITAL ENCOUNTER (EMERGENCY)
Dept: HOSPITAL 83 - ED | Age: 70
End: 2020-05-12
Payer: MEDICARE

## 2020-05-12 VITALS — WEIGHT: 250 LBS | HEIGHT: 60 IN

## 2020-05-12 DIAGNOSIS — E78.00: ICD-10-CM

## 2020-05-12 DIAGNOSIS — I46.9: Primary | ICD-10-CM

## 2020-05-12 DIAGNOSIS — Z91.018: ICD-10-CM

## 2020-05-12 DIAGNOSIS — E11.9: ICD-10-CM
